# Patient Record
Sex: FEMALE | Race: WHITE | Employment: UNEMPLOYED
[De-identification: names, ages, dates, MRNs, and addresses within clinical notes are randomized per-mention and may not be internally consistent; named-entity substitution may affect disease eponyms.]

---

## 2017-09-24 ENCOUNTER — HEALTH MAINTENANCE LETTER (OUTPATIENT)
Age: 57
End: 2017-09-24

## 2018-05-01 LAB — PAP SMEAR - HIM PATIENT REPORTED: NEGATIVE

## 2018-08-01 ENCOUNTER — TRANSFERRED RECORDS (OUTPATIENT)
Dept: MULTI SPECIALTY CLINIC | Facility: CLINIC | Age: 58
End: 2018-08-01

## 2019-03-19 ENCOUNTER — TELEPHONE (OUTPATIENT)
Dept: FAMILY MEDICINE | Facility: CLINIC | Age: 59
End: 2019-03-19

## 2019-03-19 NOTE — TELEPHONE ENCOUNTER
Reason for Call:  Same Day Appointment, Requested Provider:nellie       PCP: No primary care provider on file.    Reason for visit: to Establish Care        Have you been treated for this in the past? No    Additional comments: Please call either way if Odilia Romero will take her on as a new patient please    Can we leave a detailed message on this number? YES    Phone number patient can be reached at: Cell number on file:    Telephone Information:   Mobile [phone]       Best Time: anytime    Call taken on 3/19/2019 at 11:31 AM by Massiel Sanderson

## 2019-08-06 ENCOUNTER — OFFICE VISIT (OUTPATIENT)
Dept: FAMILY MEDICINE | Facility: CLINIC | Age: 59
End: 2019-08-06
Payer: COMMERCIAL

## 2019-08-06 VITALS
OXYGEN SATURATION: 97 % | BODY MASS INDEX: 28.95 KG/M2 | SYSTOLIC BLOOD PRESSURE: 122 MMHG | HEART RATE: 89 BPM | TEMPERATURE: 97.6 F | DIASTOLIC BLOOD PRESSURE: 72 MMHG | HEIGHT: 68 IN | RESPIRATION RATE: 14 BRPM | WEIGHT: 191 LBS

## 2019-08-06 DIAGNOSIS — J01.90 ACUTE SINUSITIS WITH SYMPTOMS > 10 DAYS: Primary | ICD-10-CM

## 2019-08-06 PROCEDURE — 99213 OFFICE O/P EST LOW 20 MIN: CPT | Performed by: PHYSICIAN ASSISTANT

## 2019-08-06 RX ORDER — DOXYCYCLINE HYCLATE 100 MG
100 TABLET ORAL 2 TIMES DAILY
Qty: 20 TABLET | Refills: 0 | Status: SHIPPED | OUTPATIENT
Start: 2019-08-06 | End: 2019-08-16

## 2019-08-06 ASSESSMENT — MIFFLIN-ST. JEOR: SCORE: 1494.87

## 2019-08-06 NOTE — PROGRESS NOTES
"Subjective     Shelbie Vegas is a 59 year old female who presents to clinic today for the following health issues:    HPI   RESPIRATORY SYMPTOMS      Duration: 2 weeks ago    Description  facial pain/pressure, cough and dizzyness    Severity: moderate    Accompanying signs and symptoms: None    History (predisposing factors):  none    Precipitating or alleviating factors: None    Therapies tried and outcome:  nyquil and allergy meds.     Symptoms began with a URI 2 weeks ago.  Cough and congestion first improved but then worsened again about 7 days ago.  She has been having facial pain and pressure,congestion and slightly cough.  Has hx of allergies and uses Zyrtec D and flonase daily.          Please abstract the following data from this visit with this patient into the appropriate field in Epic:    Mammogram done on this date: 08/2018 (approximately), by this group: [facility], results were normal   Pap smear done on this date: 05/2018 (approximately), by this group: [facility], results were normal      Reviewed and updated as needed this visit by Provider         Review of Systems   ROS COMP: Constitutional, HEENT, cardiovascular, pulmonary, GI, , musculoskeletal, neuro, skin, endocrine and psych systems are negative, except as otherwise noted.      Objective    /72   Pulse 89   Temp 97.6  F (36.4  C) (Tympanic)   Resp 14   Ht 1.727 m (5' 8\")   Wt 86.6 kg (191 lb)   LMP 09/01/2014   SpO2 97%   BMI 29.04 kg/m    Body mass index is 29.04 kg/m .  Physical Exam   GENERAL: healthy, alert and no distress  EYES: Eyes grossly normal to inspection, PERRL and conjunctivae and sclerae normal  HENT: ear canals and TM's normal, nose and mouth without ulcers or lesions, + TTP of maxillary and frontal sinus TTP  NECK: no adenopathy  RESP: lungs clear to auscultation - no rales, rhonchi or wheezes  CV: regular rate and rhythm, normal S1 S2, no S3 or S4, no murmur, click or rub    Diagnostic Test " "Results:  Labs reviewed in Epic        Assessment & Plan     1. Acute sinusitis with symptoms > 10 days  Symptoms consistent with sinusitis.  Advise that she begin doxycycline  BID x 10 days.  Follow up if new or worsening symptoms.   - doxycycline hyclate (VIBRA-TABS) 100 MG tablet; Take 1 tablet (100 mg) by mouth 2 times daily for 10 days  Dispense: 20 tablet; Refill: 0     BMI:   Estimated body mass index is 29.04 kg/m  as calculated from the following:    Height as of this encounter: 1.727 m (5' 8\").    Weight as of this encounter: 86.6 kg (191 lb).       No follow-ups on file.    Sylvester Bush PA-C  [facility]      "

## 2020-10-16 ENCOUNTER — ALLIED HEALTH/NURSE VISIT (OUTPATIENT)
Dept: NURSING | Facility: CLINIC | Age: 60
End: 2020-10-16
Payer: COMMERCIAL

## 2020-10-16 DIAGNOSIS — Z23 NEED FOR PROPHYLACTIC VACCINATION AND INOCULATION AGAINST INFLUENZA: Primary | ICD-10-CM

## 2020-10-16 PROCEDURE — 90682 RIV4 VACC RECOMBINANT DNA IM: CPT

## 2020-10-16 PROCEDURE — 99207 PR NO CHARGE NURSE ONLY: CPT

## 2020-10-16 PROCEDURE — 90471 IMMUNIZATION ADMIN: CPT

## 2020-10-21 ENCOUNTER — THERAPY VISIT (OUTPATIENT)
Dept: PHYSICAL THERAPY | Facility: CLINIC | Age: 60
End: 2020-10-21
Payer: COMMERCIAL

## 2020-10-21 DIAGNOSIS — M54.42 CHRONIC BILATERAL LOW BACK PAIN WITH BILATERAL SCIATICA: ICD-10-CM

## 2020-10-21 DIAGNOSIS — G89.29 CHRONIC BILATERAL LOW BACK PAIN WITH BILATERAL SCIATICA: ICD-10-CM

## 2020-10-21 DIAGNOSIS — M54.41 CHRONIC BILATERAL LOW BACK PAIN WITH BILATERAL SCIATICA: ICD-10-CM

## 2020-10-21 PROCEDURE — 97161 PT EVAL LOW COMPLEX 20 MIN: CPT | Mod: GP | Performed by: PHYSICAL THERAPIST

## 2020-10-21 PROCEDURE — 97110 THERAPEUTIC EXERCISES: CPT | Mod: GP | Performed by: PHYSICAL THERAPIST

## 2020-10-21 PROCEDURE — 97112 NEUROMUSCULAR REEDUCATION: CPT | Mod: GP | Performed by: PHYSICAL THERAPIST

## 2020-10-21 PROCEDURE — 97530 THERAPEUTIC ACTIVITIES: CPT | Mod: GP | Performed by: PHYSICAL THERAPIST

## 2020-10-21 NOTE — PROGRESS NOTES
[facility] Initial Evaluation -- Lumbar    Date: October 21, 2020  Shelbie Vegas is a 60 year old female with a LB, hips, and thigh condition.   Referral: spine  Work mechanical stresses:  Sitting, computer  Employment status:  --semi-retired  Leisure mechanical stresses: walking  Functional disability score (MILTON/STarT Back):  50%; 7/9--HIGH  VAS score (0-10): 6/10  Patient goals/expectations:  To get the pain to go away.    HISTORY:    Present symptoms: central LBP, B hips, B lateral thighs  Pain quality (sharp/shooting/stabbing/aching/burning/cramping):  Aching, sharp   Paresthesia (yes/no):  no    Present since (onset date): 2018, has MD orders 07/16/2020.     Symptoms (improving/unchanging/worsening):  unchanging.     Symptoms commenced as a result of: unknown--possibly mountain climbing   Condition occurred in the following environment:   unknown     Symptoms at onset (back/thigh/leg): LB, B hips and thighs  Constant symptoms (back/thigh/leg): B hips  Intermittent symptoms (back/thigh/leg): central LBP, B lateral thighs    Symptoms are made worse with the following: always walking 1 mile, standing 10 minutes, sleep interruptions, sitting 1 hour, transitional movements, lifting, bending, getting out of bed in the mornings--B hip and LBP 6/10  Symptoms are made better with the following: heat    Disturbed sleep (yes/no):  yes Sleeping postures (prone/sup/side R/L): sides, supine    Previous episodes (0/1-5/6-10/11+): ongoing throughout life Year of first episode: many years    Previous history: reports back problems throughout her life  Previous treatments: 2 lumbar injections--most recent in 08/2020--helped with LB but not hips; acupuncture and massage--no long-term benefits; kinesiologist--sometimes helps, sometimes not; PT--medx--no benefit      Specific Questions:  Cough/Sneeze/Strain (pos/neg): neg  Bowel/Bladder (normal/abnormal): normal  Gait (normal/abnormal):  normal  Medications (nil/NSAIDS/analg/steroids/anticoag/other):  Other - Thyroid and anti-inflammatory  Medical allergies:  composine  General health (excellent/good/fair/poor):  good  Pertinent medical history:  Fibromyalgia, Menopausal, Migraines/Headaches and Thyroid problems  Imaging (None/Xray/MRI/Other):  MRI  Recent or major surgery (yes/no):  no  Night pain (yes/no): no  Accidents (yes/no): no  Unexplained weight loss (yes/no): no  Barriers at home: no  Other red flags: no    EXAMINATION    Posture:   Sitting (good/fair/poor): fair  Standing (good/fair/poor):good  Lordosis (red/acc/normal): red  Correction of posture (better/worse/no effect): NE    Lateral Shift (right/left/nil): nil  Relevant (yes/no):  na  Other Observations: na    Neurological:    Motor deficit:  normal  Reflexes:  Not assessed  Sensory deficit:  Not assessed  Dural signs:  Not assessed    Movement Loss:   Pasquale Mod Min Nil Pain   Flexion  x   Increases central LBP, B hip pain   Extension   x  Decreases central LBP and B hip pain   Side Gliding R   x  Increases B LBP   Side Gliding L   x  Increases B LBP     Test Movements:   During: produces, abolishes, increases, decreases, no effect, centralizing, peripheralizing   After: better, worse, no better, no worse, no effect, centralized, peripheralized    Pretest symptoms standing:    Symptoms During Symptoms After ROM increased ROM decreased No Effect   FIS        Rep FIS        EIS        Rep EIS        Pretest symptoms lying:     Symptoms During Symptoms After ROM increased ROM decreased No Effect   VANESSA        Rep VANESSA        EIL Increases central LBP NW      Rep EIL Increases    No Worse    X--slight increase flexion, NE pain with movement     If required, pretest symptoms:    Symptoms During Symptoms After ROM increased ROM decreased No Effect   SGIS - R        Rep SGIS - R        SGIS - L        Rep SGIS - L          Static Tests:  Sitting slouched:    Sitting erect:    Standing slouched  "  Standing erect:    Lying prone in extension:  Decreases LBP, BETTER, improved flexion with less pain, decreased pain B SG Long sitting:      Other Tests: none    Provisional Classification:  Derangement - Asymmetrical, unilateral, symptoms above knee    Principle of Management:  Education:  Posture--use of lumbar roll in sitting, avoid slouch, importance/impact of posture; avoid flexion, body mechanics, POC treatment rationale, expected response   Equipment provided:  None--has lumbar roll and will try  Mechanical therapy (Y/N):  y   Extension principle:  PEGGY alternating with prone 10-15\" PEGGY, 15-30\" prone, repeat 3-4 minutes total, every 2 hours, try 3-4 reps REIL after PEGGY if tolerated  Lateral Principle:    Flexion principle:    Other:      ASSESSMENT/PLAN:    Patient is a 60 year old female with lumbar, B hip, and B lateral thigh complaints.  Provisional classification of lumbar derangement with directional preference for extension.  She has limited flexion ROM that improved after PEGGY alternating with prone.  A trial of repeated extension in lying increased her LBP but no worse after.  She will try to progress to this after working on PEGGY for a couple of days and assess.  She will benefit from posture correction as well and has a lumbar roll to use at home.     Patient has the following significant findings with corresponding treatment plan.                Diagnosis 1:  LBP, B hip and thigh pain  Pain -  self management, education, directional preference exercise and home program  Decreased ROM/flexibility - manual therapy, therapeutic exercise and home program  Decreased function - therapeutic activities and home program  Impaired posture - neuro re-education and home program     Cumulative Therapy Evaluation is: Low complexity.    Previous and current functional limitations:  (See Goal Flow Sheet for this information)    Short term and Long term goals: (See Goal Flow Sheet for this information) "     Communication ability:  Patient appears to be able to clearly communicate and understand verbal and written communication and follow directions correctly.  Treatment Explanation - The following has been discussed with the patient:   RX ordered/plan of care  Anticipated outcomes  Possible risks and side effects  This patient would benefit from PT intervention to resume normal activities.   Rehab potential is good.    Frequency:  1 X week, once daily  Duration:  for 6 weeks  Discharge Plan:  Achieve all LTG.  Independent in home treatment program.  Reach maximal therapeutic benefit.    Please refer to the daily flowsheet for treatment today, total treatment time and time spent performing 1:1 timed codes.

## 2020-10-28 ENCOUNTER — THERAPY VISIT (OUTPATIENT)
Dept: PHYSICAL THERAPY | Facility: CLINIC | Age: 60
End: 2020-10-28
Payer: COMMERCIAL

## 2020-10-28 DIAGNOSIS — M54.42 CHRONIC BILATERAL LOW BACK PAIN WITH BILATERAL SCIATICA: ICD-10-CM

## 2020-10-28 DIAGNOSIS — G89.29 CHRONIC BILATERAL LOW BACK PAIN WITH BILATERAL SCIATICA: ICD-10-CM

## 2020-10-28 DIAGNOSIS — M54.41 CHRONIC BILATERAL LOW BACK PAIN WITH BILATERAL SCIATICA: ICD-10-CM

## 2020-10-28 PROCEDURE — 97110 THERAPEUTIC EXERCISES: CPT | Mod: GP | Performed by: PHYSICAL THERAPIST

## 2020-11-04 ENCOUNTER — THERAPY VISIT (OUTPATIENT)
Dept: PHYSICAL THERAPY | Facility: CLINIC | Age: 60
End: 2020-11-04
Payer: COMMERCIAL

## 2020-11-04 DIAGNOSIS — G89.29 CHRONIC BILATERAL LOW BACK PAIN WITH BILATERAL SCIATICA: ICD-10-CM

## 2020-11-04 DIAGNOSIS — M54.41 CHRONIC BILATERAL LOW BACK PAIN WITH BILATERAL SCIATICA: ICD-10-CM

## 2020-11-04 DIAGNOSIS — M54.42 CHRONIC BILATERAL LOW BACK PAIN WITH BILATERAL SCIATICA: ICD-10-CM

## 2020-11-04 PROCEDURE — 97110 THERAPEUTIC EXERCISES: CPT | Mod: GP | Performed by: PHYSICAL THERAPIST

## 2020-11-04 PROCEDURE — 97530 THERAPEUTIC ACTIVITIES: CPT | Mod: GP | Performed by: PHYSICAL THERAPIST

## 2020-11-04 PROCEDURE — 97112 NEUROMUSCULAR REEDUCATION: CPT | Mod: GP | Performed by: PHYSICAL THERAPIST

## 2020-11-12 ENCOUNTER — THERAPY VISIT (OUTPATIENT)
Dept: PHYSICAL THERAPY | Facility: CLINIC | Age: 60
End: 2020-11-12
Payer: COMMERCIAL

## 2020-11-12 DIAGNOSIS — G89.29 CHRONIC BILATERAL LOW BACK PAIN WITH BILATERAL SCIATICA: ICD-10-CM

## 2020-11-12 DIAGNOSIS — M54.42 CHRONIC BILATERAL LOW BACK PAIN WITH BILATERAL SCIATICA: ICD-10-CM

## 2020-11-12 DIAGNOSIS — M54.41 CHRONIC BILATERAL LOW BACK PAIN WITH BILATERAL SCIATICA: ICD-10-CM

## 2020-11-12 PROCEDURE — 97110 THERAPEUTIC EXERCISES: CPT | Mod: GP | Performed by: PHYSICAL THERAPIST

## 2020-11-12 PROCEDURE — 97530 THERAPEUTIC ACTIVITIES: CPT | Mod: GP | Performed by: PHYSICAL THERAPIST

## 2020-11-18 ENCOUNTER — THERAPY VISIT (OUTPATIENT)
Dept: PHYSICAL THERAPY | Facility: CLINIC | Age: 60
End: 2020-11-18
Payer: COMMERCIAL

## 2020-11-18 DIAGNOSIS — M54.41 CHRONIC BILATERAL LOW BACK PAIN WITH BILATERAL SCIATICA: ICD-10-CM

## 2020-11-18 DIAGNOSIS — M54.42 CHRONIC BILATERAL LOW BACK PAIN WITH BILATERAL SCIATICA: ICD-10-CM

## 2020-11-18 DIAGNOSIS — G89.29 CHRONIC BILATERAL LOW BACK PAIN WITH BILATERAL SCIATICA: ICD-10-CM

## 2020-11-18 PROCEDURE — 97110 THERAPEUTIC EXERCISES: CPT | Mod: GP | Performed by: PHYSICAL THERAPIST

## 2020-11-18 PROCEDURE — 97530 THERAPEUTIC ACTIVITIES: CPT | Mod: GP | Performed by: PHYSICAL THERAPIST

## 2020-12-02 ENCOUNTER — THERAPY VISIT (OUTPATIENT)
Dept: PHYSICAL THERAPY | Facility: CLINIC | Age: 60
End: 2020-12-02
Payer: COMMERCIAL

## 2020-12-02 DIAGNOSIS — M54.41 CHRONIC BILATERAL LOW BACK PAIN WITH BILATERAL SCIATICA: ICD-10-CM

## 2020-12-02 DIAGNOSIS — M54.42 CHRONIC BILATERAL LOW BACK PAIN WITH BILATERAL SCIATICA: ICD-10-CM

## 2020-12-02 DIAGNOSIS — G89.29 CHRONIC BILATERAL LOW BACK PAIN WITH BILATERAL SCIATICA: ICD-10-CM

## 2020-12-02 PROCEDURE — 97530 THERAPEUTIC ACTIVITIES: CPT | Mod: GP | Performed by: PHYSICAL THERAPIST

## 2020-12-02 PROCEDURE — 97110 THERAPEUTIC EXERCISES: CPT | Mod: GP | Performed by: PHYSICAL THERAPIST

## 2020-12-02 NOTE — PROGRESS NOTES
"Subjective:  HPI  Physical Exam                    Objective:  System    Physical Exam    General     ROS    Assessment/Plan:    PROGRESS  REPORT    Progress reporting period is from 10/21/2020 to 12/02/2020.       SUBJECTIVE  Subjective: Patient reports her LB seems to be better overall as her pain is averaging 4-5/10 now versus 7-8/10 previously, but she reports it has been bothersome at times lately.  However, she has been doing more activity lately with the holidays and also with having her 4-year-old granddaughter at her house.  She has been trying to sit straight which helps.  She reports the R side of her back is better, but the L side seems worse at times.    \"Sleep is still miserable.\"  She tried sleeping on a softer mattress which seemed worse.      Current Pain level: 4/10.     Initial Pain level: 6/10.   Changes in function:  Yes, less pain walking, less pain with daily activities overall and has been able to do more.   Adverse reaction to treatment or activity: None    OBJECTIVE  Objective: Lumbar AROM:  flexion nil loss, NE; extension mod loss, increases LBP; B SG min loss, increases LBP both ways.  Decreased glute and abdominal strength noted with core strengthening exercises.     ASSESSMENT/PLAN  Patient has been seen for 6 visits with treatment focusing on gentle lumbar extension exercises and posture training in addition to core strengthening.  She has made slow and steady progress as expected and reports improvements with pain and function.  She continues to have exacerbations but is able to manage them better by using her exercises now.  She would benefit from additional visits to further improve functional strength and mobility as well as decrease pain.    Updated problem list and treatment plan: Diagnosis 1:  LBP, B hip and thigh pain  Pain -  self management, education, directional preference exercise and home program  Decreased ROM/flexibility - therapeutic exercise and home " program  Decreased strength - therapeutic exercise, therapeutic activities and home program  Decreased function - therapeutic activities and home program  Impaired posture - neuro re-education and home program  STG/LTGs have been met or progress has been made towards goals:  None  Assessment of Progress: The patient's condition is improving.  Self Management Plans:  Patient has been instructed in a home treatment program.  Patient  has been instructed in self management of symptoms.  I have re-evaluated this patient and find that the nature, scope, duration and intensity of the therapy is appropriate for the medical condition of the patient.  Shelbie continues to require the following intervention to meet STG and LTG's:  PT    Recommendations:  This patient would benefit from continued therapy.     Frequency:  1 X week, once daily  Duration:  for 1 weeks tapering to 2x/month for 1.5 months        Please refer to the daily flowsheet for treatment today, total treatment time and time spent performing 1:1 timed codes.

## 2020-12-09 ENCOUNTER — THERAPY VISIT (OUTPATIENT)
Dept: PHYSICAL THERAPY | Facility: CLINIC | Age: 60
End: 2020-12-09
Payer: COMMERCIAL

## 2020-12-09 DIAGNOSIS — M54.42 CHRONIC BILATERAL LOW BACK PAIN WITH BILATERAL SCIATICA: ICD-10-CM

## 2020-12-09 DIAGNOSIS — G89.29 CHRONIC BILATERAL LOW BACK PAIN WITH BILATERAL SCIATICA: ICD-10-CM

## 2020-12-09 DIAGNOSIS — M54.41 CHRONIC BILATERAL LOW BACK PAIN WITH BILATERAL SCIATICA: ICD-10-CM

## 2020-12-09 PROCEDURE — 97110 THERAPEUTIC EXERCISES: CPT | Mod: GP | Performed by: PHYSICAL THERAPIST

## 2020-12-09 PROCEDURE — 97530 THERAPEUTIC ACTIVITIES: CPT | Mod: GP | Performed by: PHYSICAL THERAPIST

## 2020-12-22 ENCOUNTER — THERAPY VISIT (OUTPATIENT)
Dept: PHYSICAL THERAPY | Facility: CLINIC | Age: 60
End: 2020-12-22
Payer: COMMERCIAL

## 2020-12-22 DIAGNOSIS — G89.29 CHRONIC BILATERAL LOW BACK PAIN WITH BILATERAL SCIATICA: ICD-10-CM

## 2020-12-22 DIAGNOSIS — M54.41 CHRONIC BILATERAL LOW BACK PAIN WITH BILATERAL SCIATICA: ICD-10-CM

## 2020-12-22 DIAGNOSIS — M54.42 CHRONIC BILATERAL LOW BACK PAIN WITH BILATERAL SCIATICA: ICD-10-CM

## 2020-12-22 PROCEDURE — 97110 THERAPEUTIC EXERCISES: CPT | Mod: GP | Performed by: PHYSICAL THERAPIST

## 2021-01-06 ENCOUNTER — THERAPY VISIT (OUTPATIENT)
Dept: PHYSICAL THERAPY | Facility: CLINIC | Age: 61
End: 2021-01-06
Payer: COMMERCIAL

## 2021-01-06 DIAGNOSIS — G89.29 CHRONIC BILATERAL LOW BACK PAIN WITH BILATERAL SCIATICA: ICD-10-CM

## 2021-01-06 DIAGNOSIS — M54.41 CHRONIC BILATERAL LOW BACK PAIN WITH BILATERAL SCIATICA: ICD-10-CM

## 2021-01-06 DIAGNOSIS — M54.42 CHRONIC BILATERAL LOW BACK PAIN WITH BILATERAL SCIATICA: ICD-10-CM

## 2021-01-06 PROCEDURE — 97530 THERAPEUTIC ACTIVITIES: CPT | Mod: GP | Performed by: PHYSICAL THERAPIST

## 2021-01-06 PROCEDURE — 97110 THERAPEUTIC EXERCISES: CPT | Mod: GP | Performed by: PHYSICAL THERAPIST

## 2021-01-27 ENCOUNTER — THERAPY VISIT (OUTPATIENT)
Dept: PHYSICAL THERAPY | Facility: CLINIC | Age: 61
End: 2021-01-27
Payer: COMMERCIAL

## 2021-01-27 DIAGNOSIS — M54.41 CHRONIC BILATERAL LOW BACK PAIN WITH BILATERAL SCIATICA: ICD-10-CM

## 2021-01-27 DIAGNOSIS — G89.29 CHRONIC BILATERAL LOW BACK PAIN WITH BILATERAL SCIATICA: ICD-10-CM

## 2021-01-27 DIAGNOSIS — M54.42 CHRONIC BILATERAL LOW BACK PAIN WITH BILATERAL SCIATICA: ICD-10-CM

## 2021-01-27 PROCEDURE — 97110 THERAPEUTIC EXERCISES: CPT | Mod: GP | Performed by: PHYSICAL THERAPIST

## 2021-01-27 PROCEDURE — 97530 THERAPEUTIC ACTIVITIES: CPT | Mod: GP | Performed by: PHYSICAL THERAPIST

## 2021-01-27 NOTE — PROGRESS NOTES
"Subjective:  HPI  Physical Exam                    Objective:  System    Physical Exam    General     ROS    Assessment/Plan:    DISCHARGE REPORT    Progress reporting period is from 12/02/2020 to 01/27/2021.       SUBJECTIVE  Subjective: Patient reports she has been doing \"pretty good.\"  She has been able to go showshoeing 3x--has been sore after each time, but a little less as she has tried more.  Sleep is still difficult, and she has to change positions frequently but reports she can at least stay in one position a little longer.  Still has pain with getting out of bed, sometimes 3/10, but this is not every day.      Current Pain level: 4/10.     Initial Pain level: 6/10.   Changes in function:  Able to do more activity in general, able to walk more, able to snowshoe.   Adverse reaction to treatment or activity: None    OBJECTIVE  Objective: Lumbar AROM:  flexion nil loss, increases LBP; extension 66%, increases LBP.       ASSESSMENT/PLAN  Patient has been seen for 10 visits with treatment focusing on core and general functional strengthening exercises to address LBP.  She has made steady and excellent progress overall and has reported improvements with pain and function throughout treatment.  She has a good HEP for continued management and should do well continuing with this independently at this point.    Updated problem list and treatment plan: Diagnosis 1:  Chronic LBP  Pain -  self management, education and home program  Decreased ROM/flexibility - home program  Decreased function - home program  Impaired posture - home program  STG/LTGs have been met or progress has been made towards goals:  None.  Assessment of Progress: The patient's condition is improving.  Self Management Plans:  Patient has been instructed in a home treatment program.  Patient is independent in a home treatment program.  Patient  has been instructed in self management of symptoms.  Patient is independent in self management of " symptoms.    Shelbie continues to require the following intervention to meet STG and LTG's:  PT intervention is no longer required to meet STG/LTG.    Recommendations:  This patient is ready to be discharged from therapy and continue their home treatment program.    Please refer to the daily flowsheet for treatment today, total treatment time and time spent performing 1:1 timed codes.

## 2022-01-12 ENCOUNTER — THERAPY VISIT (OUTPATIENT)
Dept: PHYSICAL THERAPY | Facility: CLINIC | Age: 62
End: 2022-01-12
Payer: COMMERCIAL

## 2022-01-12 DIAGNOSIS — M54.2 CERVICAL PAIN: ICD-10-CM

## 2022-01-12 PROCEDURE — 97110 THERAPEUTIC EXERCISES: CPT | Mod: GP | Performed by: PHYSICAL THERAPIST

## 2022-01-12 PROCEDURE — 97161 PT EVAL LOW COMPLEX 20 MIN: CPT | Mod: GP | Performed by: PHYSICAL THERAPIST

## 2022-01-12 NOTE — PROGRESS NOTES
[facility] Initial Evaluation -- Cervical    Evaluation Date: January 12, 2022  Shelbie Vegas is a 61 year old female with a cervical condition.   Referral: PM&R  Work mechanical stresses: computer work   Employment status: accounging  Leisure mechanical stresses: caring for granddaughter (9 months), normal daily activities  Functional disability score (NDI):    VAS score (0-10): 7/10  Patient goals/expectations:  To not have the pain.    HISTORY:    Present symptoms:  B cervical and UT pain, B suboccipitals.  Pain quality (sharp/shooting/stabbing/aching/burning/cramping):   Aching, sharp.  Paresthesia (yes/no):  Yes, intermittently into B UEs to hands    Present since (onset date): 10/22/2021     Symptoms (improving/unchanging/worsening):  unchanging    Symptoms commenced as a result of: unknown   Condition occurred in the following environment:  unknown    Symptoms at onset (neck/arm/forearm/headache): B UT and cervical pain, SO  Constant symptoms (neck/arm/forearm/headache): B UT and cervical, SO  Intermittent symptoms (neck/arm/forearm/headache): B UE numbness and tingling to hands    Symptoms are made worse with the following: turning neck either direction, sleep interruptions, lifting, sitting  Symptoms are made better with the following: infrared, heat    Disturbed sleep (yes/no): yes  Number of pillows: 1  Sleeping postures (prone/sup/side R/L): sides    Previous episodes (0/1-5/6-10/11+):11+ Year of first episode: many years ago    Previous history: ongoing neck pain for years  Previous treatments: PT in past    Specific Questions: (as reported by the patient)  Dizziness/Tinnitus/Nausea/Swallowing (pos/neg): neg  Gait/Upper Limbs (normal/abnormal): normal  Medications (nil/NSAIDS/anlag/steroids/anticoag/other):  NSAIDS and Other - Thyroid  Medical allergies:  Augmentin, compazine  General health (excellent/good/fair/poor): good  Pertinent medical history:  Fibromyalgia,  Migraines/Headaches and Thyroid problems  Imaging (None/Xray/MRI/Other):  None recent  Recent or major surgery (yes/no): no  Night pain (yes/no): no  Accidents (yes/no): no  Unexplained weight loss (yes/no): no  Barriers at home: no  Other red flags: no    EXAMINATION    Posture:   Sitting (good/fair/poor): fair  Standing (good/fair/poor): good     Protruded head (yes/no): yes    Wry Neck (right/left/nil):  nil  Relevant (yes/no):  na     Correction of posture(better/worse/no effect): NE  Other observations:  none    Neurological:    Motor Deficit:  normal   Reflexes:  Not assessed  Sensory Deficit:  Not assessed   Dural signs:  Not assessed    Movement Loss:   Pasquale Mod Min Nil Pain   Protrusion    x NE   Flexion   x  Pulling B neck   Retraction   x  Pulling B upper cervical   Extension   x  Increases B neck pain   Lateral flexion R    x Increases L neck   Lateral flexion L    x Increases R neck   Rotation R    x Increases B neck   Rotation L    x Increases B neck     Test Movements:   During: produces, abolishes, increases, decreases, no effect, centralizing, peripheralizing  After: better, worse, no better, no worse, no effect, centralized, peripheralized    Pretest symptoms sitting:  B neck pain 7/10   Symptoms During Symptoms After ROM increased ROM decreased No Effect   PRO        Rep PRO        RET W/self-OP--increases No Worse         Rep RET W/self-OP--increases No Worse    X--B rotation less pain     RET EXT        Rep RET EXT          Pretest symptoms lying:  Supine B neck pain 7/10   Symptoms During Symptoms After ROM increased ROM decreased No Effect   RET W/self-OP--increases No Worse         Rep RET W/self-OP--stretch No Worse    X--less pain B rotation     RET EXT        Rep RET EXT          If required, pretest symptoms sitting:      Symptoms During Symptoms After ROM increased ROM decreased No Effect   LF-R        Rep LF-R        LF-L        Rep LF-L        ROT-R        Rep ROT-R        ROT-L         Rep ROT-L        FLEX        Rep FLEX            Static Tests:   Protrusion:    Flexion:    Retraction:    Extension (sitting/prone/supine):      Other Tests:     Provisional Classification:  Derangement - Bilateral, symmetrical, symptoms above elbow    Principle of Management:  Education:  POC, treatment rationale, expected response    Equipment provided:    Mechanical therapy (Y/N):  y   Extension principle:  Supine cervical retraction with self-OP x10 reps, 4-6x/day   Lateral principle:    Flexion principle:     Other:      ASSESSMENT/PLAN:    Patient is a 61 year old female with cervical complaints.  She had decreased pain with B rotation after seated and supine retraction but had less pain with supine so will try at home to assess further.  She should make progress with treatment focusing on cervical retraction exercises and general posture and scapular strengthening to improve mechanics and posture and improve overall mobility and function.      Patient has the following significant findings with corresponding treatment plan.                Diagnosis 1:  Chronic neck pain  Pain -  self management, education, directional preference exercise and home program  Decreased ROM/flexibility - manual therapy, therapeutic exercise and home program  Decreased function - therapeutic activities and home program  Impaired posture - neuro re-education and home program      Cumulative Therapy Evaluation is: Low complexity.    Previous and current functional limitations:  (See Goal Flow Sheet for this information)    Short term and Long term goals: (See Goal Flow Sheet for this information)     Communication ability:  Patient appears to be able to clearly communicate and understand verbal and written communication and follow directions correctly.  Treatment Explanation - The following has been discussed with the patient:   RX ordered/plan of care  Anticipated outcomes  Possible risks and side effects  This patient would  benefit from PT intervention to resume normal activities.   Rehab potential is good.    Frequency:  1 X week, once daily  Duration:  for 8 weeks  Discharge Plan:  Achieve all LTG.  Independent in home treatment program.  Reach maximal therapeutic benefit.    Please refer to the daily flowsheet for treatment today, total treatment time and time spent performing 1:1 timed codes.

## 2022-01-12 NOTE — LETTER
[address]CO [address]  [address]  [facility] [address]  [phone]    2022    Re: Shelbie Vegas   :   1960  MRN:  [id number]   REFERRING PHYSICIAN:   Ajay SINGH [facility]    Date of Initial Evaluation:  22  Visits:  Rxs Used: 1  Reason for Referral:  Cervical pain    EVALUATION SUMMARY    [place] [facility] Initial Evaluation -- Cervical    Evaluation Date: 2022  Shelbie Vegas is a 61 year old female with a cervical condition.   Referral: PM&R  Work mechanical stresses: computer work   Employment status: accounging  Leisure mechanical stresses: caring for granddaughter (9 months), normal daily activities  Functional disability score (NDI):    VAS score (0-10): 7/10  Patient goals/expectations:  To not have the pain.    HISTORY:    Present symptoms:  B cervical and UT pain, B suboccipitals.  Pain quality (sharp/shooting/stabbing/aching/burning/cramping):   Aching, sharp.  Paresthesia (yes/no):  Yes, intermittently into B UEs to hands    Present since (onset date): 10/22/2021     Symptoms (improving/unchanging/worsening):  unchanging    Symptoms commenced as a result of: unknown   Condition occurred in the following environment:  unknown    Symptoms at onset (neck/arm/forearm/headache): B UT and cervical pain, SO  Constant symptoms (neck/arm/forearm/headache): B UT and cervical, SO  Intermittent symptoms (neck/arm/forearm/headache): B UE numbness and tingling to hands          Re: Shelbie Vegas   :   1960        Symptoms are made worse with the following: turning neck either direction, sleep interruptions, lifting, sitting  Symptoms are made better with the following: infrared, heat    Disturbed sleep (yes/no): yes  Number of pillows: 1  Sleeping postures (prone/sup/side R/L): sides    Previous episodes (0/1-5/6-10/11+):11+ Year of  first episode: many years ago    Previous history: ongoing neck pain for years  Previous treatments: PT in past    Specific Questions: (as reported by the patient)  Dizziness/Tinnitus/Nausea/Swallowing (pos/neg): neg  Gait/Upper Limbs (normal/abnormal): normal  Medications (nil/NSAIDS/anlag/steroids/anticoag/other):  NSAIDS and Other - Thyroid  Medical allergies:  Augmentin, compazine  General health (excellent/good/fair/poor): good  Pertinent medical history:  Fibromyalgia, Migraines/Headaches and Thyroid problems  Imaging (None/Xray/MRI/Other):  None recent  Recent or major surgery (yes/no): no  Night pain (yes/no): no  Accidents (yes/no): no  Unexplained weight loss (yes/no): no  Barriers at home: no  Other red flags: no    EXAMINATION    Posture:   Sitting (good/fair/poor): fair  Standing (good/fair/poor): good     Protruded head (yes/no): yes    Wry Neck (right/left/nil):  nil  Relevant (yes/no):  na     Correction of posture(better/worse/no effect): NE  Other observations:  none    Neurological:    Motor Deficit:  normal   Reflexes:  Not assessed  Sensory Deficit:  Not assessed   Dural signs:  Not assessed              Re: Shelbie Vegas   :   1960      Movement Loss:   Pasquale Mod Min Nil Pain   Protrusion    x NE   Flexion   x  Pulling B neck   Retraction   x  Pulling B upper cervical   Extension   x  Increases B neck pain   Lateral flexion R    x Increases L neck   Lateral flexion L    x Increases R neck   Rotation R    x Increases B neck   Rotation L    x Increases B neck     Test Movements:   During: produces, abolishes, increases, decreases, no effect, centralizing, peripheralizing  After: better, worse, no better, no worse, no effect, centralized, peripheralized    Pretest symptoms sitting:  B neck pain 7/10   Symptoms During Symptoms After ROM increased ROM decreased No Effect   PRO        Rep PRO        RET W/self-OP--increases No Worse         Rep RET W/self-OP--increases No Worse    X--B  rotation less pain     RET EXT        Rep RET EXT          Pretest symptoms lying:  Supine B neck pain 7/10   Symptoms During Symptoms After ROM increased ROM decreased No Effect   RET W/self-OP--increases No Worse         Rep RET W/self-OP--stretch No Worse    X--less pain B rotation     RET EXT        Rep RET EXT                            Re: Shelbie Vegas   :   1960          If required, pretest symptoms sitting:      Symptoms During Symptoms After ROM increased ROM decreased No Effect   LF-R        Rep LF-R        LF-L        Rep LF-L        ROT-R        Rep ROT-R        ROT-L        Rep ROT-L        FLEX        Rep FLEX            Static Tests:   Protrusion:    Flexion:    Retraction:    Extension (sitting/prone/supine):      Other Tests:     Provisional Classification:  Derangement - Bilateral, symmetrical, symptoms above elbow    Principle of Management:  Education:  POC, treatment rationale, expected response    Equipment provided:    Mechanical therapy (Y/N):  y   Extension principle:  Supine cervical retraction with self-OP x10 reps, 4-6x/day   Lateral principle:    Flexion principle:     Other:      ASSESSMENT/PLAN:    Patient is a 61 year old female with cervical complaints.  She had decreased pain with B rotation after seated and supine retraction but had less pain with supine so will try at home to assess further.  She should make progress with treatment focusing on cervical retraction exercises and general posture and scapular strengthening to improve mechanics and posture and improve overall mobility and function.      Patient has the following significant findings with corresponding treatment plan.                Diagnosis 1:  Chronic neck pain  Pain -  self management, education, directional preference exercise and home program  Decreased ROM/flexibility - manual therapy, therapeutic exercise and home program  Decreased function - therapeutic activities and home program  Impaired  posture - neuro re-education and home program      Re: Shelbie Vegas   :   1960          Cumulative Therapy Evaluation is: Low complexity.    Previous and current functional limitations:  (See Goal Flow Sheet for this information)    Short term and Long term goals: (See Goal Flow Sheet for this information)     Communication ability:  Patient appears to be able to clearly communicate and understand verbal and written communication and follow directions correctly.  Treatment Explanation - The following has been discussed with the patient:   RX ordered/plan of care  Anticipated outcomes  Possible risks and side effects  This patient would benefit from PT intervention to resume normal activities.   Rehab potential is good.    Frequency:  1 X week, once daily  Duration:  for 8 weeks  Discharge Plan:  Achieve all LTG.  Independent in home treatment program.  Reach maximal therapeutic benefit.    Thank you for your referral.    INQUIRIES  Therapist: Rita Taylor PT   [facility] SERVICES [address]  [address]  [facility] [address]  Phone: [phone]  Fax: [phone]

## 2022-01-19 ENCOUNTER — THERAPY VISIT (OUTPATIENT)
Dept: PHYSICAL THERAPY | Facility: CLINIC | Age: 62
End: 2022-01-19
Payer: COMMERCIAL

## 2022-01-19 DIAGNOSIS — M54.2 CERVICAL PAIN: ICD-10-CM

## 2022-01-19 PROCEDURE — 97110 THERAPEUTIC EXERCISES: CPT | Mod: GP | Performed by: PHYSICAL THERAPIST

## 2022-01-19 PROCEDURE — 97530 THERAPEUTIC ACTIVITIES: CPT | Mod: GP | Performed by: PHYSICAL THERAPIST

## 2022-01-24 ENCOUNTER — THERAPY VISIT (OUTPATIENT)
Dept: PHYSICAL THERAPY | Facility: CLINIC | Age: 62
End: 2022-01-24
Payer: COMMERCIAL

## 2022-01-24 DIAGNOSIS — M54.2 CERVICAL PAIN: ICD-10-CM

## 2022-01-24 PROCEDURE — 97110 THERAPEUTIC EXERCISES: CPT | Mod: GP | Performed by: PHYSICAL THERAPIST

## 2022-01-26 ENCOUNTER — THERAPY VISIT (OUTPATIENT)
Dept: PHYSICAL THERAPY | Facility: CLINIC | Age: 62
End: 2022-01-26
Payer: COMMERCIAL

## 2022-01-26 DIAGNOSIS — M54.50 ACUTE BILATERAL LOW BACK PAIN WITHOUT SCIATICA: ICD-10-CM

## 2022-01-26 PROCEDURE — 97110 THERAPEUTIC EXERCISES: CPT | Mod: GP | Performed by: PHYSICAL THERAPIST

## 2022-01-26 PROCEDURE — 97161 PT EVAL LOW COMPLEX 20 MIN: CPT | Mod: GP | Performed by: PHYSICAL THERAPIST

## 2022-01-26 PROCEDURE — 97140 MANUAL THERAPY 1/> REGIONS: CPT | Mod: GP | Performed by: PHYSICAL THERAPIST

## 2022-01-26 NOTE — LETTER
[address]  [address]  [facility] [address]  [phone]    February 3, 2022    Re: Shelbie Vegas   :   1960  MRN:  [id number]   REFERRING PHYSICIAN:   Hasmukh SINGH [facility]    Date of Initial Evaluation:  22  Visits:     Reason for Referral:  Acute bilateral low back pain without sciatica    EVALUATION SUMMARY    [place] [facility] Initial Evaluation -- Lumbar    Date: 2022  Shelbie Vegas is a 61 year old female with a lumbar condition.   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    HISTORY:    Present symptoms: R LBP  Pain quality (sharp/shooting/stabbing/aching/burning/cramping):  Sharp, aching   Paresthesia (yes/no):  no    Present since (onset date): 2022.     Symptoms (improving/unchanging/worsening):  worsening.     Symptoms commenced as a result of: snowmobiling for 3 hours   Condition occurred in the following environment:   Recreation       Symptoms at onset (back/thigh/leg): B LBP, R>L, R posterior thigh to knee  Constant symptoms (back/thigh/leg): R>L LBP  Intermittent symptoms (back/thigh/leg): none            Re: Shelbie Vegas   :   1960              Symptoms are made worse with the following: Always Bending, Always Sitting--constant pain, Always Standing--constant pain, Always Walking--constant pain and Other - getting out of bed, worse in the mornings, rising from sitting     Symptoms are made better with the following: nothing    Disturbed sleep (yes/no):  Yes, losing less than 1 hour/night Sleeping postures (prone/sup/side R/L):  sides    Previous episodes (0/1-5/6-10/11+): many Year of first episode: years ago--has chronic LB issues    Previous history: chronic history of LBP  Previous treatments: PT--helpful      Specific Questions:  Cough/Sneeze/Strain (pos/neg): neg  Bowel/Bladder (normal/abnormal): normal  Gait (normal/abnormal): antalgic, R shift  Medications (nil/NSAIDS/analg/steroids/anticoag/other):  Steroid dose pack started 1 day ago, thyroid  Medical allergies:  Augmentin, compazine  General health (excellent/good/fair/poor): good  Pertinent medical history:  Fibromyalgia, Migraines/Headaches and Thyroid problems  Imaging (None/Xray/MRI/Other):  None recent  Recent or major surgery (yes/no): no  Night pain (yes/no): no  Accidents (yes/no): no  Unexplained weight loss (yes/no): no  Barriers at home: no  Other red flags: no    EXAMINATION    Posture:   Sitting (good/fair/poor): fair  Standing (good/fair/poor):fair--shifted to R  Lordosis (red/acc/normal): red  Correction of posture (better/worse/no effect): NE    Lateral Shift (right/left/nil): RIGHT  Relevant (yes/no):  yes  Other Observations: none        Re: Shelbie Vegas   :   1960        Neurological:    Motor deficit:  normal  Reflexes:  Not assessed  Sensory deficit:  normal  Dural signs:  Not assessed    Movement Loss:   Pasquale Mod Min Nil Pain   Flexion  x   PDM, ERP   Extension   x  ERP   Side Gliding R    x NE   Side Gliding L x    PDM, ERP, B LB     Test Movements:   During: produces, abolishes, increases, decreases, no effect, centralizing, peripheralizing   After: better, worse, no better, no worse, no effect, centralized, peripheralized    Pretest symptoms standing:    Symptoms During Symptoms After ROM increased ROM decreased No Effect   FIS        Rep FIS        EIS        Rep EIS          Pretest symptoms lying:     Symptoms During Symptoms After ROM increased ROM decreased No Effect   VANESSA        Rep VANESSA        EIL        Rep EIL          If required,  pretest symptoms: R LBP 5/10   Symptoms During Symptoms After ROM increased ROM decreased No Effect   SGIS - R        Rep SGIS - R        SGIS - L Increases    No Worse         Rep SGIS - L Decreases    Better    X--flex, ext, and L SG             Re: Shelbie Vegas   :   1960            Static Tests:  Sitting slouched:     Sitting erect:    Standing slouched:   Standing erect:    Lying prone in extension:   Long sitting:      Other Tests: unilateral pressup, L leg on table--decreases, better, improved ROM flexion, extension, and L SG    Provisional Classification:  Derangement - Asymmetrical, unilateral, symptoms above knee    Principle of Management:  Education:  Posture--use of lumbar roll in sitting, varying size depending on symptoms; POC, treatment rationale, expected response   Equipment provided:  none  Mechanical therapy (Y/N):  y   Extension principle:    Lateral Principle:  L SGIS x10-12 reps followed by 1 rep EIS; L unilateral pressup x10 reps after doing L SGIS on wall  Flexion principle:    Other:      ASSESSMENT/PLAN:    Patient is a 61 year old female with lumbar complaints.  Provisional classification of derangement with directional preference for L sideglides.  She stands with a R lateral shift which improved after L SGIS exercises.  She had improved ROM and decreased pain after L unilateral pressups as well.  She will try L SGIS followed by L unilateral pressups to assess further effect on symptoms and mechanics.  She should make good progress with treatment focusing on directional preference exercises to improve mechanics and decrease pain.        Patient has the following significant findings with corresponding treatment plan.                Diagnosis 1:  LBP  Pain -  self management, education, directional preference exercise and home program  Decreased ROM/flexibility - manual therapy, therapeutic exercise and home program  Decreased function - therapeutic activities and home  program  Impaired posture - neuro re-education and home program    Cumulative Therapy Evaluation is: Low complexity.    Previous and current functional limitations:  (See Goal Flow Sheet for this information)    Short term and Long term goals: (See Goal Flow Sheet for this information)           Re: Shelbie Vegas   :   1960            Communication ability:  Patient appears to be able to clearly communicate and understand verbal and written communication and follow directions correctly.  Treatment Explanation - The following has been discussed with the patient:   RX ordered/plan of care  Anticipated outcomes  Possible risks and side effects  This patient would benefit from PT intervention to resume normal activities.   Rehab potential is good.  Frequency:  1 X week, once daily  Duration:  for 6 weeks  Discharge Plan:  Achieve all LTG.  Independent in home treatment program.  Reach maximal therapeutic benefit.    Thank you for your referral.    INQUIRIES  Therapist: Rita Taylor PT  [facility] SERVICES [address]  [address]  [facility] [address]  Phone: [phone]  Fax: [phone]

## 2022-01-26 NOTE — PROGRESS NOTES
[place] [facility] Initial Evaluation -- Lumbar    Date: January 26, 2022  Shelbei Vegas is a 61 year old female with a lumbar condition.   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    HISTORY:    Present symptoms: R LBP  Pain quality (sharp/shooting/stabbing/aching/burning/cramping):  Sharp, aching   Paresthesia (yes/no):  no    Present since (onset date): 01/22/2022.     Symptoms (improving/unchanging/worsening):  worsening.     Symptoms commenced as a result of: snowmobiling for 3 hours   Condition occurred in the following environment:   Recreation       Symptoms at onset (back/thigh/leg): B LBP, R>L, R posterior thigh to knee  Constant symptoms (back/thigh/leg): R>L LBP  Intermittent symptoms (back/thigh/leg): none    Symptoms are made worse with the following: Always Bending, Always Sitting--constant pain, Always Standing--constant pain, Always Walking--constant pain and Other - getting out of bed, worse in the mornings, rising from sitting     Symptoms are made better with the following: nothing    Disturbed sleep (yes/no):  Yes, losing less than 1 hour/night Sleeping postures (prone/sup/side R/L): sides    Previous episodes (0/1-5/6-10/11+): many Year of first episode: years ago--has chronic LB issues    Previous history: chronic history of LBP  Previous treatments: PT--helpful      Specific Questions:  Cough/Sneeze/Strain (pos/neg): neg  Bowel/Bladder (normal/abnormal): normal  Gait (normal/abnormal): antalgic, R shift  Medications (nil/NSAIDS/analg/steroids/anticoag/other):  Steroid dose pack started 1 day ago, thyroid  Medical allergies:  Augmentin, compazine  General health (excellent/good/fair/poor):  good  Pertinent medical history:  Fibromyalgia, Migraines/Headaches and Thyroid problems  Imaging (None/Xray/MRI/Other):  None recent  Recent or major surgery (yes/no): no  Night pain (yes/no): no  Accidents (yes/no): no  Unexplained weight loss (yes/no): no  Barriers at home: no  Other red flags: no    EXAMINATION    Posture:   Sitting (good/fair/poor): fair  Standing (good/fair/poor):fair--shifted to R  Lordosis (red/acc/normal): red  Correction of posture (better/worse/no effect): NE    Lateral Shift (right/left/nil): RIGHT  Relevant (yes/no):  yes  Other Observations: none    Neurological:    Motor deficit:  normal  Reflexes:  Not assessed  Sensory deficit:  normal  Dural signs:  Not assessed    Movement Loss:   Pasquale Mod Min Nil Pain   Flexion  x   PDM, ERP   Extension   x  ERP   Side Gliding R    x NE   Side Gliding L x    PDM, ERP, B LB     Test Movements:   During: produces, abolishes, increases, decreases, no effect, centralizing, peripheralizing   After: better, worse, no better, no worse, no effect, centralized, peripheralized    Pretest symptoms standing:    Symptoms During Symptoms After ROM increased ROM decreased No Effect   FIS        Rep FIS        EIS        Rep EIS          Pretest symptoms lying:     Symptoms During Symptoms After ROM increased ROM decreased No Effect   VANESSA        Rep VANESSA        EIL        Rep EIL          If required, pretest symptoms: R LBP 5/10   Symptoms During Symptoms After ROM increased ROM decreased No Effect   SGIS - R        Rep SGIS - R        SGIS - L Increases    No Worse         Rep SGIS - L Decreases    Better    X--flex, ext, and L SG       Static Tests:  Sitting slouched:     Sitting erect:    Standing slouched:   Standing erect:    Lying prone in extension:   Long sitting:      Other Tests: unilateral pressup, L leg on table--decreases, better, improved ROM flexion, extension, and L SG    Provisional Classification:  Derangement - Asymmetrical, unilateral,  symptoms above knee    Principle of Management:  Education:  Posture--use of lumbar roll in sitting, varying size depending on symptoms; POC, treatment rationale, expected response   Equipment provided:  none  Mechanical therapy (Y/N):  y   Extension principle:    Lateral Principle:  L SGIS x10-12 reps followed by 1 rep EIS; L unilateral pressup x10 reps after doing L SGIS on wall  Flexion principle:    Other:      ASSESSMENT/PLAN:    Patient is a 61 year old female with lumbar complaints.  Provisional classification of derangement with directional preference for L sideglides.  She stands with a R lateral shift which improved after L SGIS exercises.  She had improved ROM and decreased pain after L unilateral pressups as well.  She will try L SGIS followed by L unilateral pressups to assess further effect on symptoms and mechanics.  She should make good progress with treatment focusing on directional preference exercises to improve mechanics and decrease pain.        Patient has the following significant findings with corresponding treatment plan.                Diagnosis 1:  LBP  Pain -  self management, education, directional preference exercise and home program  Decreased ROM/flexibility - manual therapy, therapeutic exercise and home program  Decreased function - therapeutic activities and home program  Impaired posture - neuro re-education and home program    Cumulative Therapy Evaluation is: Low complexity.    Previous and current functional limitations:  (See Goal Flow Sheet for this information)    Short term and Long term goals: (See Goal Flow Sheet for this information)     Communication ability:  Patient appears to be able to clearly communicate and understand verbal and written communication and follow directions correctly.  Treatment Explanation - The following has been discussed with the patient:   RX ordered/plan of care  Anticipated outcomes  Possible risks and side effects  This patient would benefit  from PT intervention to resume normal activities.   Rehab potential is good.    Frequency:  1 X week, once daily  Duration:  for 6 weeks  Discharge Plan:  Achieve all LTG.  Independent in home treatment program.  Reach maximal therapeutic benefit.    Please refer to the daily flowsheet for treatment today, total treatment time and time spent performing 1:1 timed codes.

## 2022-01-31 ENCOUNTER — THERAPY VISIT (OUTPATIENT)
Dept: PHYSICAL THERAPY | Facility: CLINIC | Age: 62
End: 2022-01-31
Payer: COMMERCIAL

## 2022-01-31 DIAGNOSIS — M54.50 ACUTE BILATERAL LOW BACK PAIN WITHOUT SCIATICA: ICD-10-CM

## 2022-01-31 DIAGNOSIS — M54.2 CERVICAL PAIN: ICD-10-CM

## 2022-01-31 PROCEDURE — 97110 THERAPEUTIC EXERCISES: CPT | Mod: GP | Performed by: PHYSICAL THERAPIST

## 2022-01-31 PROCEDURE — 97140 MANUAL THERAPY 1/> REGIONS: CPT | Mod: GP | Performed by: PHYSICAL THERAPIST

## 2022-02-14 ENCOUNTER — THERAPY VISIT (OUTPATIENT)
Dept: PHYSICAL THERAPY | Facility: CLINIC | Age: 62
End: 2022-02-14
Payer: COMMERCIAL

## 2022-02-14 DIAGNOSIS — M54.2 CERVICAL PAIN: ICD-10-CM

## 2022-02-14 DIAGNOSIS — M54.50 ACUTE BILATERAL LOW BACK PAIN WITHOUT SCIATICA: ICD-10-CM

## 2022-02-14 PROCEDURE — 97140 MANUAL THERAPY 1/> REGIONS: CPT | Mod: GP | Performed by: PHYSICAL THERAPIST

## 2022-02-14 PROCEDURE — 97110 THERAPEUTIC EXERCISES: CPT | Mod: GP | Performed by: PHYSICAL THERAPIST

## 2022-02-14 PROCEDURE — 97530 THERAPEUTIC ACTIVITIES: CPT | Mod: GP | Performed by: PHYSICAL THERAPIST

## 2022-02-14 NOTE — LETTER
"[address]  [address]  [facility] [address] [phone]    2022    Re: Shelbie Vegas   :   1960  MRN:  [id number]   REFERRING PHYSICIAN:   Hasmukh Whitley    [facility]    DISCHARGE REPORT    Progress reporting period is from 2022 to 2022.       SUBJECTIVE    Subjective: Patient reports her LB is better overall, but she still has pain on the R side.  She notices the pain especially when she has to be on her feet more--i.e. several hours when hosting a party or cleaning/cooking at home.  She can sit for about 30 minutes at least without difficulty and hasn't really had to test this longer lately.  Getting up from a chair is not as painful for her LBP as it was previously--only 1/10 now.      Current Pain level: 2/10.     Initial Pain level: 8/10.   Changes in function:  Yes, decreased pain rising from sitting, decreased pain in neck with turning.  Adverse reaction to treatment or activity: standing    OBJECTIVE    Changes noted in objective findings:  Objective: Lumbar AROM:  flexion min loss, increases R LBP; extension min loss--decreases LBP \"always feels good\".  Cervical AROM:  B rotation nil loss, increases L neck pain; extension min loss, increases L neck pain.  Decreased pain cervical extension after repeated retraction with self-OP--added extension to last 3 reps and patient will try at home.       ASSESSMENT/PLAN    Treatment has focused on directional preference exercises for the cervical and lumbar spine to address neck and LB issues.  Patient has made good progress with both areas since her initial visits and reports decreased pain and improved function overall.  She has a chronic history of pain in both areas but has exercises she can use regularly to manage her symptoms.  She should do well continuing with her HEP independently at this point.  "                 Re: Shelbie Vegas   :   1960        Updated problem list and treatment plan: Diagnosis 1:  Neck and LBP  Pain -  self management, education, directional preference exercise and home program  Decreased ROM/flexibility - home program  Decreased function - home program  Impaired posture - home program  STG/LTGs have been met or progress has been made towards goals:  Yes (See Goal flow sheet completed today.)  Assessment of Progress: The patient's condition is improving.  Self Management Plans:  Patient has been instructed in a home treatment program.  Patient is independent in a home treatment program.  Patient  has been instructed in self management of symptoms.  Patient is independent in self management of symptoms.  Shelbie continues to require the following intervention to meet STG and LTG's:  PT intervention is no longer required to meet STG/LTG.    Recommendations:    This patient is ready to be discharged from therapy and continue their home treatment program.    Thank you for your referral.    INQUIRIES  Therapist:  Rita Taylor PT   [facility] SERVICES [address]  [address]  [facility] [address]  Phone: [phone]  Fax: [phone]

## 2022-02-14 NOTE — PROGRESS NOTES
"Subjective:  HPI  Physical Exam                    Objective:  System    Physical Exam    General     ROS    Assessment/Plan:    DISCHARGE REPORT    Progress reporting period is from 01/12/2022 to 02/14/2022.       SUBJECTIVE  Subjective: Patient reports her LB is better overall, but she still has pain on the R side.  She notices the pain especially when she has to be on her feet more--i.e. several hours when hosting a party or cleaning/cooking at home.  She can sit for about 30 minutes at least without difficulty and hasn't really had to test this longer lately.  Getting up from a chair is not as painful for her LBP as it was previously--only 1/10 now.      Current Pain level: 2/10.     Initial Pain level: 8/10.   Changes in function:  Yes, decreased pain rising from sitting, decreased pain in neck with turning.  Adverse reaction to treatment or activity: standing    OBJECTIVE  Changes noted in objective findings:  Objective: Lumbar AROM:  flexion min loss, increases R LBP; extension min loss--decreases LBP \"always feels good\".  Cervical AROM:  B rotation nil loss, increases L neck pain; extension min loss, increases L neck pain.  Decreased pain cervical extension after repeated retraction with self-OP--added extension to last 3 reps and patient will try at home.       ASSESSMENT/PLAN  Treatment has focused on directional preference exercises for the cervical and lumbar spine to address neck and LB issues.  Patient has made good progress with both areas since her initial visits and reports decreased pain and improved function overall.  She has a chronic history of pain in both areas but has exercises she can use regularly to manage her symptoms.  She should do well continuing with her HEP independently at this point.    Updated problem list and treatment plan: Diagnosis 1:  Neck and LBP  Pain -  self management, education, directional preference exercise and home program  Decreased ROM/flexibility - home " program  Decreased function - home program  Impaired posture - home program  STG/LTGs have been met or progress has been made towards goals:  Yes (See Goal flow sheet completed today.)  Assessment of Progress: The patient's condition is improving.  Self Management Plans:  Patient has been instructed in a home treatment program.  Patient is independent in a home treatment program.  Patient  has been instructed in self management of symptoms.  Patient is independent in self management of symptoms.  Shelbie continues to require the following intervention to meet STG and LTG's:  PT intervention is no longer required to meet STG/LTG.    Recommendations:  This patient is ready to be discharged from therapy and continue their home treatment program.    Please refer to the daily flowsheet for treatment today, total treatment time and time spent performing 1:1 timed codes.

## 2023-11-10 ENCOUNTER — THERAPY VISIT (OUTPATIENT)
Dept: PHYSICAL THERAPY | Facility: CLINIC | Age: 63
End: 2023-11-10
Payer: COMMERCIAL

## 2023-11-10 DIAGNOSIS — G89.29 CHRONIC BILATERAL LOW BACK PAIN WITH BILATERAL SCIATICA: Primary | ICD-10-CM

## 2023-11-10 DIAGNOSIS — M54.42 CHRONIC BILATERAL LOW BACK PAIN WITH BILATERAL SCIATICA: Primary | ICD-10-CM

## 2023-11-10 DIAGNOSIS — M54.41 CHRONIC BILATERAL LOW BACK PAIN WITH BILATERAL SCIATICA: Primary | ICD-10-CM

## 2023-11-10 PROCEDURE — 97110 THERAPEUTIC EXERCISES: CPT | Mod: GP | Performed by: PHYSICAL THERAPIST

## 2023-11-10 PROCEDURE — 97161 PT EVAL LOW COMPLEX 20 MIN: CPT | Mod: GP | Performed by: PHYSICAL THERAPIST

## 2023-11-10 PROCEDURE — 97112 NEUROMUSCULAR REEDUCATION: CPT | Mod: GP | Performed by: PHYSICAL THERAPIST

## 2023-11-10 NOTE — PROGRESS NOTES
PHYSICAL THERAPY EVALUATION  Type of Visit: Evaluation  Patient reports a chronic history of LBP with intermittent flare-ups.  Symptoms flared-up again 07/10/2023 for unknown reasons.  She was recently on a trip to [place] with a significant amount of walking, and this significantly flared her symptoms--difficulty sleeping.    See electronic medical record for Abuse and Falls Screening details.    Subjective       Presenting condition or subjective complaint: lower back  Date of onset: 07/10/23    Relevant medical history:     Past Medical History:   Diagnosis Date    Chronic constipation     Linzess per [facility]    Hepatic steatosis 2013    per ultrasound    Herpes zoster 2014    right upper back     Hypothyroidism 2010    Dr Westbrook    Migraine headaches     Seasonal allergic rhinitis     fall to spring    Upper back pain on left side        Dates & types of surgery:    Past Surgical History:   Procedure Laterality Date    COLONOSCOPY  2011    diverticulosis, repeat 10 years     Current Outpatient Medications   Medication    cetirizine-psuedoePHEDrine (ZYRTEC-D) 5-120 MG per tablet    fluticasone (FLONASE) 50 MCG/ACT nasal spray    HYDROcodone-acetaminophen (NORCO) 5-325 MG per tablet    IMITREX 100 MG OR TABS    levothyroxine (SSYNTHROID,LEVOTHROID) 100 MCG tablet    MAGNESIUM ASPARTATE PO    naproxen (NAPROSYN) 500 MG tablet     No current facility-administered medications for this visit.         Prior diagnostic imaging/testing results: CT scan; X-ray     Prior therapy history for the same diagnosis, illness or injury: Yes      Prior Level of Function  Transfers: Independent  Ambulation: Independent  ADL: Independent  IADL:     Living Environment  Social support: With a significant other or spouse   Type of home: Multi-level; House   Stairs to enter the home: Yes 4     Ramp: No   Stairs inside the home: Yes       Help at home: None  Equipment owned: -- (none)     Employment: No    Hobbies/Interests: reading,  "puzzles, card games, travel    Patient goals for therapy: sleep, walk, play    Pain assessment: Location: B LB, thighs, buttocks/Ratin-10     Objective   LUMBAR SPINE EVALUATION  PAIN:   INTEGUMENTARY (edema, incisions):   POSTURE:   GAIT:   Weightbearing Status:   Assistive Device(s):   Gait Deviations:   BALANCE/PROPRIOCEPTION:   WEIGHTBEARING ALIGNMENT:   NON-WEIGHTBEARING ALIGNMENT:    ROM:   (Degrees) Left AROM Left PROM  Right AROM Right PROM   Hip Flexion       Hip Extension       Hip Abduction       Hip Adduction       Hip Internal Rotation       Hip External Rotation       Knee Flexion       Knee Extension       Lumbar Side glide Min loss, NE Min loss, NE   Lumbar Flexion Min loss, increase B LB and buttock pain   Lumbar Extension Mod loss--\"feels good\"   Pain:   End feel:   PELVIC/SI SCREEN:   STRENGTH:   Work mechanical stresses:  none  Leisure mechanical stresses: normal household activities, cares for grandchildren 2 days/week--ages 2 and 5 months  Functional disability score (MILTON/STarT Back):    VAS score (0-10): 2/10      ADDITIONAL HISTORY:  Present symptoms: B LBP, B buttock pain, R>L thigh pain  Pain quality: aching, stiff  Paresthesia (yes/no):  yes, R lateral thigh  Symptoms (improving/unchanging/worsening):  worsening  Symptoms commenced as a result of:  unknown  Condition occurred in the following environment:   unknown     Symptoms at onset (back/thigh/leg): B LB, B buttocks  Constant symptoms (back/thigh/leg): none  Intermittent symptoms (back/thigh/leg): B LB, buttocks, R>L thighs, R>L thigh numbness    Symptoms are made worse with the following: sitting in couch--sometimes a few minutes, sometimes hours;  getting up from sitting, especially couch; standing, sleeping, walking 1 mile  Symptoms are made better with the following: sometimes exercises, bending back    Disturbed sleep (yes/no):  yes--loses 2 hours/night Sleeping postures (prone/sup/side R/L): sides, supine    Previous " "episodes (0/1-5/6-10/11+): several Year of first episode: years ago    Previous history: PT in past--helpful  Previous treatments: PT in past--helpful    Specific Questions:  Cough/Sneeze/Strain (pos/neg): neg  Bowel/Bladder (normal/abnormal): normal  Gait (normal/abnormal): normal  Medications (nil/NSAIDS/analg/steroids/anticoag/other):  see above  Medical allergies:  see chart  General health (excellent/good/fair/poor):  good  Pertinent medical history:  see above  Imaging (None/Xray/MRI/Other):  see above  Recent or major surgery (yes/no):  see above  Night pain (yes/no): no  Accidents (yes/no): no  Unexplained weight loss (yes/no): no  Barriers at home: no  Other red flags: no    Postural Observation:   Sitting:  mild slouch   Change of posture: Better  Standing:  red lordosis   Lateral Shift: Nil.  Other Observations: none    Neurological:  Motor Deficit:  normal   Reflexes:  not assessed  Sensory Deficit:   normal   Neurodynamic tests:  not assessed    Test Movements:   During: produces, abolishes, increases, decreases, no effect, centralizing, peripheralizing   After: better, worse, no better, no worse, no effect, centralized, peripheralized    Symptomatic response Mechanical response    During testing After testing Effect - increased ROM, decreased ROM, or key functional test No Effect   Pretest symptoms standin/10 B LBP     Rep FIS       Rep EIS decrease No Better          Pretest symptoms lying: prone lying--/10 B LBP    During testing After testing Effect - increased ROM, decreased ROM, or key functional test No Effect   Rep VANESSA       Rep EIL Unable due to wrist pain        If required, pretest symptoms:    During testing After testing Effect - increased ROM, decreased ROM, or key functional test No Effect   Rep SGIS - R       Rep SGIS - L         Static Tests:  Lying prone in extension: \"feels good\", NB,    Trial lumbar ext mobs in prone--increase during, ?better after, NE ROM    Provisional " Classification: Inconclusive/Other - Mechanically Inconclusive    Potential Drivers of Pain and/or Disability: none    Principle of Management:  Education:  POC, treatment rationale, expected response; Posture and need to change recliner sitting and posture to decrease stress on spine, use of roll   Equipment provided:  none  Mechanical therapy (Y/N):  y   Extension principle:  LADI or Rep PEGGY x10 reps, every 2  hours  Lateral Principle:    Flexion principle:    Other:    MYOTOMES:   DTR S:   CORD SIGNS:   DERMATOMES:   NEURAL TENSION:   FLEXIBILITY:   LUMBAR/HIP Special Tests:    PELVIS/SI SPECIAL TESTS:   FUNCTIONAL TESTS:   PALPATION:   SPINAL SEGMENTAL CONCLUSIONS:       Assessment & Plan   CLINICAL IMPRESSIONS  Medical Diagnosis: LBP w/B radiculopathy    Treatment Diagnosis: LBP w/B radiculopathy   Impression/Assessment: Patient is a 63 year old female with lumbar and LE complaints.  The following significant findings have been identified: Pain, Decreased ROM/flexibility, Decreased activity tolerance, and Impaired posture. These impairments interfere with their ability to perform self care tasks, recreational activities, household chores, driving , household mobility, and community mobility as compared to previous level of function.     Clinical Decision Making (Complexity):  Clinical Presentation: Stable/Uncomplicated  Clinical Presentation Rationale: based on medical and personal factors listed in PT evaluation  Clinical Decision Making (Complexity): Low complexity    PLAN OF CARE  Treatment Interventions:  Interventions: Manual Therapy, Neuromuscular Re-education, Therapeutic Activity, Therapeutic Exercise, Self-Care/Home Management    Long Term Goals     PT Goal 1  Goal Identifier: rising from sitting  Goal Description: Patient will be able to rise from sitting with 2/10 pain or less  Rationale:  (for normal mobility and transitional movements)  Goal Progress: currently up to 8/10  Target Date:  01/05/24      Frequency of Treatment: 1x/week  Duration of Treatment: 8 weeks    Recommended Referrals to Other Professionals: none  Education Assessment:        Risks and benefits of evaluation/treatment have been explained.   Patient/Family/caregiver agrees with Plan of Care.     Evaluation Time:     PT Eval, Low Complexity Minutes (79502): 18       Signing Clinician: Rita Taylor PT

## 2023-11-21 ENCOUNTER — THERAPY VISIT (OUTPATIENT)
Dept: PHYSICAL THERAPY | Facility: CLINIC | Age: 63
End: 2023-11-21
Payer: COMMERCIAL

## 2023-11-21 DIAGNOSIS — M54.41 CHRONIC BILATERAL LOW BACK PAIN WITH BILATERAL SCIATICA: Primary | ICD-10-CM

## 2023-11-21 DIAGNOSIS — M54.42 CHRONIC BILATERAL LOW BACK PAIN WITH BILATERAL SCIATICA: Primary | ICD-10-CM

## 2023-11-21 DIAGNOSIS — G89.29 CHRONIC BILATERAL LOW BACK PAIN WITH BILATERAL SCIATICA: Primary | ICD-10-CM

## 2023-11-21 PROCEDURE — 97140 MANUAL THERAPY 1/> REGIONS: CPT | Mod: GP | Performed by: PHYSICAL THERAPIST

## 2023-11-21 PROCEDURE — 97530 THERAPEUTIC ACTIVITIES: CPT | Mod: GP | Performed by: PHYSICAL THERAPIST

## 2023-11-21 PROCEDURE — 97110 THERAPEUTIC EXERCISES: CPT | Mod: GP | Performed by: PHYSICAL THERAPIST

## 2023-12-19 ENCOUNTER — THERAPY VISIT (OUTPATIENT)
Dept: PHYSICAL THERAPY | Facility: CLINIC | Age: 63
End: 2023-12-19
Payer: COMMERCIAL

## 2023-12-19 DIAGNOSIS — M54.42 CHRONIC BILATERAL LOW BACK PAIN WITH BILATERAL SCIATICA: Primary | ICD-10-CM

## 2023-12-19 DIAGNOSIS — G89.29 CHRONIC BILATERAL LOW BACK PAIN WITH BILATERAL SCIATICA: Primary | ICD-10-CM

## 2023-12-19 DIAGNOSIS — M54.41 CHRONIC BILATERAL LOW BACK PAIN WITH BILATERAL SCIATICA: Primary | ICD-10-CM

## 2023-12-19 PROCEDURE — 97140 MANUAL THERAPY 1/> REGIONS: CPT | Mod: GP | Performed by: PHYSICAL THERAPIST

## 2023-12-19 PROCEDURE — 97110 THERAPEUTIC EXERCISES: CPT | Mod: GP | Performed by: PHYSICAL THERAPIST

## 2024-01-22 ENCOUNTER — THERAPY VISIT (OUTPATIENT)
Dept: PHYSICAL THERAPY | Facility: CLINIC | Age: 64
End: 2024-01-22
Payer: COMMERCIAL

## 2024-01-22 DIAGNOSIS — M54.42 CHRONIC BILATERAL LOW BACK PAIN WITH BILATERAL SCIATICA: Primary | ICD-10-CM

## 2024-01-22 DIAGNOSIS — G89.29 CHRONIC BILATERAL LOW BACK PAIN WITH BILATERAL SCIATICA: Primary | ICD-10-CM

## 2024-01-22 DIAGNOSIS — M54.41 CHRONIC BILATERAL LOW BACK PAIN WITH BILATERAL SCIATICA: Primary | ICD-10-CM

## 2024-01-22 PROCEDURE — 97140 MANUAL THERAPY 1/> REGIONS: CPT | Mod: GP | Performed by: PHYSICAL THERAPIST

## 2024-01-22 PROCEDURE — 97110 THERAPEUTIC EXERCISES: CPT | Mod: GP | Performed by: PHYSICAL THERAPIST

## 2024-01-23 NOTE — PROGRESS NOTES
DISCHARGE  Reason for Discharge: Patient is doing well overall and has a good HEP for continued management and improvement.  She will continue with this independently at this point.     Equipment Issued: none    Discharge Plan: Patient to continue home program.    Referring Provider:  Hasmukh Whitley

## 2025-08-04 ENCOUNTER — THERAPY VISIT (OUTPATIENT)
Age: 65
End: 2025-08-04
Payer: COMMERCIAL

## 2025-08-04 DIAGNOSIS — M25.512 CHRONIC PAIN OF BOTH SHOULDERS: Primary | ICD-10-CM

## 2025-08-04 DIAGNOSIS — M25.511 CHRONIC PAIN OF BOTH SHOULDERS: Primary | ICD-10-CM

## 2025-08-04 DIAGNOSIS — G89.29 CHRONIC PAIN OF BOTH SHOULDERS: Primary | ICD-10-CM

## 2025-08-04 PROCEDURE — 97161 PT EVAL LOW COMPLEX 20 MIN: CPT | Mod: GP | Performed by: PHYSICAL THERAPIST

## 2025-08-04 PROCEDURE — 97110 THERAPEUTIC EXERCISES: CPT | Mod: GP | Performed by: PHYSICAL THERAPIST

## 2025-08-18 ENCOUNTER — THERAPY VISIT (OUTPATIENT)
Age: 65
End: 2025-08-18
Payer: COMMERCIAL

## 2025-08-18 DIAGNOSIS — M54.2 CERVICAL PAIN: Primary | ICD-10-CM

## 2025-08-18 DIAGNOSIS — M25.511 CHRONIC PAIN OF BOTH SHOULDERS: ICD-10-CM

## 2025-08-18 DIAGNOSIS — M25.512 CHRONIC PAIN OF BOTH SHOULDERS: ICD-10-CM

## 2025-08-18 DIAGNOSIS — G89.29 CHRONIC PAIN OF BOTH SHOULDERS: ICD-10-CM

## 2025-08-18 PROCEDURE — 97530 THERAPEUTIC ACTIVITIES: CPT | Mod: GP | Performed by: PHYSICAL THERAPIST

## 2025-08-18 PROCEDURE — 97112 NEUROMUSCULAR REEDUCATION: CPT | Mod: GP | Performed by: PHYSICAL THERAPIST

## 2025-08-18 PROCEDURE — 97110 THERAPEUTIC EXERCISES: CPT | Mod: GP | Performed by: PHYSICAL THERAPIST
